# Patient Record
Sex: FEMALE | Race: WHITE | NOT HISPANIC OR LATINO
[De-identification: names, ages, dates, MRNs, and addresses within clinical notes are randomized per-mention and may not be internally consistent; named-entity substitution may affect disease eponyms.]

---

## 2023-04-17 ENCOUNTER — NON-APPOINTMENT (OUTPATIENT)
Age: 33
End: 2023-04-17

## 2023-04-24 PROBLEM — Z00.00 ENCOUNTER FOR PREVENTIVE HEALTH EXAMINATION: Status: ACTIVE | Noted: 2023-04-24

## 2023-06-08 ENCOUNTER — APPOINTMENT (OUTPATIENT)
Dept: OBGYN | Facility: CLINIC | Age: 33
End: 2023-06-08
Payer: COMMERCIAL

## 2023-06-08 ENCOUNTER — TRANSCRIPTION ENCOUNTER (OUTPATIENT)
Age: 33
End: 2023-06-08

## 2023-06-08 VITALS
HEIGHT: 68 IN | WEIGHT: 105 LBS | BODY MASS INDEX: 15.91 KG/M2 | SYSTOLIC BLOOD PRESSURE: 96 MMHG | DIASTOLIC BLOOD PRESSURE: 60 MMHG

## 2023-06-08 DIAGNOSIS — Z01.419 ENCOUNTER FOR GYNECOLOGICAL EXAMINATION (GENERAL) (ROUTINE) W/OUT ABNORMAL FINDINGS: ICD-10-CM

## 2023-06-08 PROCEDURE — 99385 PREV VISIT NEW AGE 18-39: CPT

## 2023-06-15 ENCOUNTER — APPOINTMENT (OUTPATIENT)
Dept: OBGYN | Facility: CLINIC | Age: 33
End: 2023-06-15

## 2023-06-16 NOTE — HISTORY OF PRESENT ILLNESS
[FreeTextEntry1] : HPI: Patient is a 34yo female presenting for her well woman exam. \par Patient is without complaints today.\par \par ROS: neg unless specified in HPI\par \par OB Hx: G0\par \par Gyn Hx: \par Menses: Q35-38 days\par No known fibroids, ovarian cysts, or other gynecologic problems\par Pap: none in EMR\par Contraception:none\par \par PMH: denies\par PSH: denies\par Meds: denies\par All: NKDA\par FH: Denies any breast, gynecologic, or GI cancers in the family\par SH: neg x 3\par \par Additional Exam: \par \par Gyn: Normal appearing external genitalia, normal appearing vagina and cervix, no CMT, bimanual with normal sized mobile uterus, no fixed adnexal masses or tenderness\par \par Breast: No lymphadenopathy in the neck, chest wall, bilateral supraclavicular, infraclavicular, and bilateral axillary areas. \par No overt asymmetry in bilateral breast contour with normal appearing skin and normal appearing nipple areolar complex bilaterally. \par No nipple discharge expressed.\par \par A/P: **yo ** female here for annual exam\par Gyn Screening:\par - Pap: cytology + automatic HR HPV sent\par - TSH sent, pt desiring to conceive.\par - Recommended HPV vaccine \par - STI screening: GC/CT/Trich sent\par \par Breast Screening: \par - Discussed self-breast exam\par - Clinical breast exam performed\par \par AHM: CV, Pulmonary, Endocrine, GI, Bone Screening, Vitamin supplementation, and Immunizations with PCP\par \par RTO for results and US\par GEORGI Ryan MD\par

## 2023-06-29 LAB
25(OH)D3 SERPL-MCNC: 33.1 NG/ML
C TRACH RRNA SPEC QL NAA+PROBE: NOT DETECTED
CYTOLOGY CVX/VAG DOC THIN PREP: NORMAL
HPV HIGH+LOW RISK DNA PNL CVX: NOT DETECTED
N GONORRHOEA RRNA SPEC QL NAA+PROBE: NOT DETECTED
SOURCE AMPLIFICATION: NORMAL
SOURCE TP AMPLIFICATION: NORMAL
T VAGINALIS RRNA SPEC QL NAA+PROBE: NOT DETECTED
TSH SERPL-ACNC: 1.13 UIU/ML

## 2023-07-26 ENCOUNTER — APPOINTMENT (OUTPATIENT)
Dept: OBGYN | Facility: CLINIC | Age: 33
End: 2023-07-26

## 2023-10-02 ENCOUNTER — APPOINTMENT (OUTPATIENT)
Dept: OBGYN | Facility: CLINIC | Age: 33
End: 2023-10-02
Payer: COMMERCIAL

## 2023-10-02 VITALS
HEIGHT: 68 IN | BODY MASS INDEX: 16.22 KG/M2 | WEIGHT: 107 LBS | SYSTOLIC BLOOD PRESSURE: 90 MMHG | DIASTOLIC BLOOD PRESSURE: 70 MMHG

## 2023-10-02 PROCEDURE — 76856 US EXAM PELVIC COMPLETE: CPT

## 2023-10-02 PROCEDURE — 99215 OFFICE O/P EST HI 40 MIN: CPT | Mod: 25

## 2023-10-09 RX ORDER — DOXYCYCLINE 100 MG/1
100 CAPSULE ORAL TWICE DAILY
Qty: 10 | Refills: 0 | Status: ACTIVE | COMMUNITY
Start: 2023-10-09 | End: 1900-01-01

## 2023-10-13 ENCOUNTER — RESULT REVIEW (OUTPATIENT)
Age: 33
End: 2023-10-13

## 2023-10-13 ENCOUNTER — OUTPATIENT (OUTPATIENT)
Dept: OUTPATIENT SERVICES | Facility: HOSPITAL | Age: 33
LOS: 1 days | End: 2023-10-13
Payer: COMMERCIAL

## 2023-10-13 DIAGNOSIS — Z00.00 ENCOUNTER FOR GENERAL ADULT MEDICAL EXAMINATION WITHOUT ABNORMAL FINDINGS: ICD-10-CM

## 2023-10-13 PROCEDURE — 74740 X-RAY FEMALE GENITAL TRACT: CPT

## 2023-10-13 PROCEDURE — 58340 CATHETER FOR HYSTEROGRAPHY: CPT | Mod: 1L

## 2023-10-13 PROCEDURE — 74740 X-RAY FEMALE GENITAL TRACT: CPT | Mod: 26,1L

## 2023-10-14 DIAGNOSIS — Z00.00 ENCOUNTER FOR GENERAL ADULT MEDICAL EXAMINATION WITHOUT ABNORMAL FINDINGS: ICD-10-CM

## 2023-12-21 ENCOUNTER — APPOINTMENT (OUTPATIENT)
Dept: OBGYN | Facility: CLINIC | Age: 33
End: 2023-12-21
Payer: COMMERCIAL

## 2023-12-21 VITALS
HEIGHT: 68 IN | WEIGHT: 108 LBS | DIASTOLIC BLOOD PRESSURE: 62 MMHG | BODY MASS INDEX: 16.37 KG/M2 | SYSTOLIC BLOOD PRESSURE: 108 MMHG

## 2023-12-21 PROCEDURE — 99215 OFFICE O/P EST HI 40 MIN: CPT | Mod: 25

## 2023-12-21 PROCEDURE — 76830 TRANSVAGINAL US NON-OB: CPT

## 2024-01-10 ENCOUNTER — ASOB RESULT (OUTPATIENT)
Age: 34
End: 2024-01-10

## 2024-01-10 ENCOUNTER — APPOINTMENT (OUTPATIENT)
Dept: MATERNAL FETAL MEDICINE | Facility: CLINIC | Age: 34
End: 2024-01-10
Payer: COMMERCIAL

## 2024-01-10 PROCEDURE — 99442: CPT

## 2024-01-12 ENCOUNTER — APPOINTMENT (OUTPATIENT)
Dept: ANTEPARTUM | Facility: CLINIC | Age: 34
End: 2024-01-12
Payer: COMMERCIAL

## 2024-01-12 PROCEDURE — 36415 COLL VENOUS BLD VENIPUNCTURE: CPT

## 2024-01-22 ENCOUNTER — NON-APPOINTMENT (OUTPATIENT)
Age: 34
End: 2024-01-22

## 2024-01-23 ENCOUNTER — ASOB RESULT (OUTPATIENT)
Age: 34
End: 2024-01-23

## 2024-01-23 ENCOUNTER — APPOINTMENT (OUTPATIENT)
Dept: ANTEPARTUM | Facility: CLINIC | Age: 34
End: 2024-01-23
Payer: COMMERCIAL

## 2024-01-23 DIAGNOSIS — O35.9XX0 MATERNAL CARE FOR (SUSPECTED) FETAL ABNORMALITY AND DAMAGE, UNSPECIFIED, NOT APPLICABLE OR UNSPECIFIED: ICD-10-CM

## 2024-01-23 PROCEDURE — 76813 OB US NUCHAL MEAS 1 GEST: CPT

## 2024-01-23 PROCEDURE — 36415 COLL VENOUS BLD VENIPUNCTURE: CPT

## 2024-01-25 ENCOUNTER — LABORATORY RESULT (OUTPATIENT)
Age: 34
End: 2024-01-25

## 2024-01-25 ENCOUNTER — APPOINTMENT (OUTPATIENT)
Dept: OBGYN | Facility: CLINIC | Age: 34
End: 2024-01-25
Payer: COMMERCIAL

## 2024-01-25 VITALS
HEIGHT: 68 IN | DIASTOLIC BLOOD PRESSURE: 68 MMHG | SYSTOLIC BLOOD PRESSURE: 106 MMHG | BODY MASS INDEX: 16.37 KG/M2 | RESPIRATION RATE: 16 BRPM | WEIGHT: 108 LBS

## 2024-01-25 DIAGNOSIS — Z34.91 ENCOUNTER FOR SUPERVISION OF NORMAL PREGNANCY, UNSPECIFIED, FIRST TRIMESTER: ICD-10-CM

## 2024-01-25 PROCEDURE — 99215 OFFICE O/P EST HI 40 MIN: CPT

## 2024-02-07 LAB
ABO + RH PNL BLD: NORMAL
APPEARANCE: ABNORMAL
BACTERIA UR CULT: NORMAL
BASOPHILS # BLD AUTO: 0.06 K/UL
BASOPHILS NFR BLD AUTO: 0.8 %
BILIRUBIN URINE: NEGATIVE
BLD GP AB SCN SERPL QL: NORMAL
BLOOD URINE: NEGATIVE
C TRACH RRNA SPEC QL NAA+PROBE: NOT DETECTED
COLOR: NORMAL
EOSINOPHIL # BLD AUTO: 0.17 K/UL
EOSINOPHIL NFR BLD AUTO: 2.1 %
GLUCOSE QUALITATIVE U: NEGATIVE MG/DL
HBV SURFACE AG SER QL: NONREACTIVE
HCT VFR BLD CALC: 36.7 %
HCV AB SER QL: NONREACTIVE
HCV S/CO RATIO: 0.07 S/CO
HGB A MFR BLD: 97.1 %
HGB A2 MFR BLD: 2.5 %
HGB BLD-MCNC: 11.6 G/DL
HGB F MFR BLD: 0.4 %
HGB FRACT BLD-IMP: NORMAL
HIV1+2 AB SPEC QL IA.RAPID: NONREACTIVE
IMM GRANULOCYTES NFR BLD AUTO: 0.3 %
KETONES URINE: ABNORMAL MG/DL
LEAD BLD-MCNC: <1 UG/DL
LEUKOCYTE ESTERASE URINE: ABNORMAL
LYMPHOCYTES # BLD AUTO: 1.6 K/UL
LYMPHOCYTES NFR BLD AUTO: 20.2 %
M TB IFN-G BLD-IMP: NEGATIVE
MAN DIFF?: NORMAL
MCHC RBC-ENTMCNC: 25.4 PG
MCHC RBC-ENTMCNC: 31.6 GM/DL
MCV RBC AUTO: 80.3 FL
MEV IGG FLD QL IA: 117 AU/ML
MEV IGG+IGM SER-IMP: POSITIVE
MONOCYTES # BLD AUTO: 0.52 K/UL
MONOCYTES NFR BLD AUTO: 6.6 %
N GONORRHOEA RRNA SPEC QL NAA+PROBE: NOT DETECTED
NEUTROPHILS # BLD AUTO: 5.55 K/UL
NEUTROPHILS NFR BLD AUTO: 70 %
NITRITE URINE: NEGATIVE
PH URINE: 5
PLATELET # BLD AUTO: 242 K/UL
PROTEIN URINE: NORMAL MG/DL
QUANTIFERON TB PLUS MITOGEN MINUS NIL: 8.5 IU/ML
QUANTIFERON TB PLUS NIL: 0.01 IU/ML
QUANTIFERON TB PLUS TB1 MINUS NIL: 0 IU/ML
QUANTIFERON TB PLUS TB2 MINUS NIL: 0 IU/ML
RBC # BLD: 4.57 M/UL
RBC # FLD: 14.8 %
RUBV IGG FLD-ACNC: 6.3 INDEX
RUBV IGG SER-IMP: POSITIVE
SOURCE AMPLIFICATION: NORMAL
SPECIFIC GRAVITY URINE: >1.03
T PALLIDUM AB SER QL IA: NEGATIVE
UROBILINOGEN URINE: 0.2 MG/DL
VZV AB TITR SER: POSITIVE
VZV IGG SER IF-ACNC: 1387 INDEX
WBC # FLD AUTO: 7.92 K/UL

## 2024-02-14 LAB
ADDITIONAL US: NORMAL
COMMENTS: AFP: NORMAL
CRL SCAN TWIN B: NORMAL
CRL SCAN: NORMAL
CROWN RUMP LENGTH TWIN B: NORMAL
CROWN RUMP LENGTH: 67.9 MM
DOWN SYNDROME AGE RISK: NORMAL
DOWN SYNDROME INTERPRETATION: NORMAL
DOWN SYNDROME SCREENING RISK: NORMAL
GEST. AGE ON COLLECTION DATE: 12.9 WEEKS
HCG MOM: 1.94
HCG VALUE: 223.4 IU/ML
MATERNAL AGE AT EDD: 36.2 YR
NOTE: AFP: NORMAL
NT MOM TWIN B: NORMAL
NT TWIN B: NORMAL
NUCHAL TRANSLUCENCY (NT): 1.6 MM
NUCHAL TRANSLUCENCY MOM: 0.91
NUMBER OF FETUSES: 1
PAPP-A MOM: 1.36
PAPP-A VALUE: 2329.4 NG/ML
RACE: NORMAL
RESULTS AFP: NORMAL
SONOGRAPHER ID#: NORMAL
SUBMIT PART 2 SAMPLE USING: NORMAL
TEST RESULTS: AFP: NORMAL
TRISOMY 18 AGE RISK: NORMAL
TRISOMY 18 INTERPRETATION: NORMAL
TRISOMY 18 SCREENING RISK: NORMAL
WEIGHT AFP: 108 LBS

## 2024-02-20 ENCOUNTER — NON-APPOINTMENT (OUTPATIENT)
Age: 34
End: 2024-02-20

## 2024-02-21 ENCOUNTER — NON-APPOINTMENT (OUTPATIENT)
Age: 34
End: 2024-02-21

## 2024-02-22 ENCOUNTER — APPOINTMENT (OUTPATIENT)
Dept: OBGYN | Facility: CLINIC | Age: 34
End: 2024-02-22
Payer: COMMERCIAL

## 2024-02-22 ENCOUNTER — NON-APPOINTMENT (OUTPATIENT)
Age: 34
End: 2024-02-22

## 2024-02-22 VITALS
BODY MASS INDEX: 16.97 KG/M2 | WEIGHT: 112 LBS | SYSTOLIC BLOOD PRESSURE: 96 MMHG | HEIGHT: 68 IN | DIASTOLIC BLOOD PRESSURE: 58 MMHG

## 2024-02-22 DIAGNOSIS — Z34.90 ENCOUNTER FOR SUPERVISION OF NORMAL PREGNANCY, UNSPECIFIED, UNSPECIFIED TRIMESTER: ICD-10-CM

## 2024-02-22 PROCEDURE — 0502F SUBSEQUENT PRENATAL CARE: CPT

## 2024-02-28 LAB
ADDITIONAL US: NORMAL
AFP MOM: 0.99
AFP VALUE: 44.7 NG/ML
APPEARANCE: CLEAR
BILIRUBIN URINE: NEGATIVE
BLOOD URINE: NEGATIVE
COLLECTED ON 2: NORMAL
COLLECTED ON: NORMAL
COLOR: YELLOW
CRL SCAN TWIN B: NORMAL
CRL SCAN: NORMAL
CROWN RUMP LENGTH TWIN B: NORMAL
CROWN RUMP LENGTH: 67.9 MM
DIA MOM: 1.14
DIA VALUE: 216.1 PG/ML
DOWN SYNDROME AGE RISK: NORMAL
DOWN SYNDROME INTERPRETATION: NORMAL
DOWN SYNDROME SCREENING RISK: NORMAL
FIRST TRIMESTER SAMPLE: NORMAL
GEST. AGE ON COLLECTION DATE: 12.9 WEEKS
GESTATIONAL AGE: 17.1 WEEKS
GLUCOSE QUALITATIVE U: NEGATIVE
HCG MOM: 1.21
HCG VALUE: 42.9 IU/ML
INSULIN DEP DIABETES: NO
KETONES URINE: NEGATIVE
LEUKOCYTE ESTERASE URINE: NEGATIVE
MATERNAL AGE AT EDD: 36.2 YR
NITRITE URINE: NEGATIVE
NT MOM TWIN B: NORMAL
NT TWIN B: NORMAL
NUCHAL TRANSLUCENCY (NT): 1.6 MM
NUCHAL TRANSLUCENCY MOM: 0.91
NUMBER OF FETUSES: 1
OPEN SPINA BIFIDA: NORMAL
OSB INTERPRETATION: NORMAL
PAPP-A MOM: 1.36
PAPP-A VALUE: 2329.4 NG/ML
PH URINE: 7
PROTEIN URINE: NEGATIVE
RACE: NORMAL
SECOND TRIMESTER SAMPLE: NORMAL
SEQUENTIAL 2 COMMENTS: NORMAL
SEQUENTIAL 2 NOTE: NORMAL
SEQUENTIAL 2 RESULTS: NORMAL
SEQUENTIAL 2 TEST RESULTS: NORMAL
SONOGRAPHER ID#: NORMAL
SPECIFIC GRAVITY URINE: 1.01
TRISOMY 18 AGE RISK: NORMAL
TRISOMY 18 INTERPRETATION: NORMAL
TRISOMY 18 SCREENING RISK: NORMAL
UE3 MOM: 0.6
UE3 VALUE: 0.77 NG/ML
UROBILINOGEN URINE: 0.2 (ref 0.2–?)
WEIGHT AFP: 108 LBS
WEIGHT: 106 LBS

## 2024-03-12 ENCOUNTER — NON-APPOINTMENT (OUTPATIENT)
Age: 34
End: 2024-03-12

## 2024-03-20 ENCOUNTER — ASOB RESULT (OUTPATIENT)
Age: 34
End: 2024-03-20

## 2024-03-20 ENCOUNTER — APPOINTMENT (OUTPATIENT)
Dept: ANTEPARTUM | Facility: CLINIC | Age: 34
End: 2024-03-20
Payer: COMMERCIAL

## 2024-03-20 ENCOUNTER — NON-APPOINTMENT (OUTPATIENT)
Age: 34
End: 2024-03-20

## 2024-03-20 PROCEDURE — 76811 OB US DETAILED SNGL FETUS: CPT

## 2024-03-20 PROCEDURE — 76817 TRANSVAGINAL US OBSTETRIC: CPT

## 2024-03-21 ENCOUNTER — APPOINTMENT (OUTPATIENT)
Dept: OBGYN | Facility: CLINIC | Age: 34
End: 2024-03-21
Payer: COMMERCIAL

## 2024-03-21 VITALS
DIASTOLIC BLOOD PRESSURE: 50 MMHG | SYSTOLIC BLOOD PRESSURE: 102 MMHG | WEIGHT: 120 LBS | HEIGHT: 68 IN | BODY MASS INDEX: 18.19 KG/M2

## 2024-03-21 LAB
BILIRUB UR QL STRIP: NORMAL
CLARITY UR: CLEAR
COLLECTION METHOD: NORMAL
GLUCOSE UR-MCNC: NORMAL
HCG UR QL: 0.2 EU/DL
HGB UR QL STRIP.AUTO: NORMAL
KETONES UR-MCNC: NORMAL
LEUKOCYTE ESTERASE UR QL STRIP: NORMAL
NITRITE UR QL STRIP: NORMAL
PH UR STRIP: 7
PROT UR STRIP-MCNC: NORMAL
SP GR UR STRIP: 1.02

## 2024-03-21 PROCEDURE — 0502F SUBSEQUENT PRENATAL CARE: CPT

## 2024-03-21 PROCEDURE — 81003 URINALYSIS AUTO W/O SCOPE: CPT | Mod: NC,QW

## 2024-04-17 ENCOUNTER — NON-APPOINTMENT (OUTPATIENT)
Age: 34
End: 2024-04-17

## 2024-04-17 ENCOUNTER — APPOINTMENT (OUTPATIENT)
Dept: OBGYN | Facility: CLINIC | Age: 34
End: 2024-04-17
Payer: COMMERCIAL

## 2024-04-17 VITALS
WEIGHT: 121 LBS | SYSTOLIC BLOOD PRESSURE: 110 MMHG | HEART RATE: 74 BPM | HEIGHT: 68 IN | BODY MASS INDEX: 18.34 KG/M2 | DIASTOLIC BLOOD PRESSURE: 60 MMHG | RESPIRATION RATE: 15 BRPM

## 2024-04-17 PROCEDURE — 0502F SUBSEQUENT PRENATAL CARE: CPT

## 2024-05-09 ENCOUNTER — NON-APPOINTMENT (OUTPATIENT)
Age: 34
End: 2024-05-09

## 2024-05-13 ENCOUNTER — ASOB RESULT (OUTPATIENT)
Age: 34
End: 2024-05-13

## 2024-05-13 ENCOUNTER — APPOINTMENT (OUTPATIENT)
Dept: OBGYN | Facility: CLINIC | Age: 34
End: 2024-05-13
Payer: COMMERCIAL

## 2024-05-13 ENCOUNTER — APPOINTMENT (OUTPATIENT)
Dept: ANTEPARTUM | Facility: CLINIC | Age: 34
End: 2024-05-13
Payer: COMMERCIAL

## 2024-05-13 VITALS
DIASTOLIC BLOOD PRESSURE: 60 MMHG | WEIGHT: 128 LBS | BODY MASS INDEX: 19.4 KG/M2 | HEIGHT: 68 IN | SYSTOLIC BLOOD PRESSURE: 112 MMHG

## 2024-05-13 DIAGNOSIS — Z34.92 ENCOUNTER FOR SUPERVISION OF NORMAL PREGNANCY, UNSPECIFIED, SECOND TRIMESTER: ICD-10-CM

## 2024-05-13 PROCEDURE — 36415 COLL VENOUS BLD VENIPUNCTURE: CPT

## 2024-05-13 PROCEDURE — 76819 FETAL BIOPHYS PROFIL W/O NST: CPT

## 2024-05-13 PROCEDURE — 0502F SUBSEQUENT PRENATAL CARE: CPT

## 2024-05-13 PROCEDURE — 81003 URINALYSIS AUTO W/O SCOPE: CPT | Mod: NC,QW

## 2024-05-13 PROCEDURE — 76816 OB US FOLLOW-UP PER FETUS: CPT

## 2024-05-14 LAB
BASOPHILS # BLD AUTO: 0.06 K/UL
BASOPHILS NFR BLD AUTO: 0.6 %
EOSINOPHIL # BLD AUTO: 0.12 K/UL
EOSINOPHIL NFR BLD AUTO: 1.2 %
GLUCOSE 1H P 50 G GLC PO SERPL-MCNC: 90 MG/DL
HCT VFR BLD CALC: 38.7 %
HGB BLD-MCNC: 11.6 G/DL
IMM GRANULOCYTES NFR BLD AUTO: 1 %
LYMPHOCYTES # BLD AUTO: 1.49 K/UL
LYMPHOCYTES NFR BLD AUTO: 15.4 %
MAN DIFF?: NORMAL
MCHC RBC-ENTMCNC: 26.2 PG
MCHC RBC-ENTMCNC: 30 GM/DL
MCV RBC AUTO: 87.4 FL
MONOCYTES # BLD AUTO: 0.6 K/UL
MONOCYTES NFR BLD AUTO: 6.2 %
MUV AB SER-ACNC: POSITIVE
MUV IGG SER QL IA: 137 AU/ML
NEUTROPHILS # BLD AUTO: 7.33 K/UL
NEUTROPHILS NFR BLD AUTO: 75.6 %
PLATELET # BLD AUTO: 201 K/UL
RBC # BLD: 4.43 M/UL
RBC # FLD: 14.7 %
T PALLIDUM AB SER QL IA: NEGATIVE
WBC # FLD AUTO: 9.7 K/UL

## 2024-05-20 ENCOUNTER — NON-APPOINTMENT (OUTPATIENT)
Age: 34
End: 2024-05-20

## 2024-05-20 LAB — MUV IGM SER QL IA: <0.8 AU

## 2024-05-20 RX ORDER — VALACYCLOVIR 1 G/1
1 TABLET, FILM COATED ORAL
Qty: 10 | Refills: 3 | Status: ACTIVE | COMMUNITY
Start: 2024-05-20 | End: 1900-01-01

## 2024-06-05 ENCOUNTER — APPOINTMENT (OUTPATIENT)
Dept: OBGYN | Facility: CLINIC | Age: 34
End: 2024-06-05
Payer: COMMERCIAL

## 2024-06-05 VITALS
DIASTOLIC BLOOD PRESSURE: 70 MMHG | WEIGHT: 132 LBS | HEIGHT: 68 IN | SYSTOLIC BLOOD PRESSURE: 114 MMHG | BODY MASS INDEX: 20 KG/M2

## 2024-06-05 PROCEDURE — 0502F SUBSEQUENT PRENATAL CARE: CPT

## 2024-06-05 PROCEDURE — 90471 IMMUNIZATION ADMIN: CPT

## 2024-06-05 PROCEDURE — 81003 URINALYSIS AUTO W/O SCOPE: CPT | Mod: NC,QW

## 2024-06-05 PROCEDURE — 90715 TDAP VACCINE 7 YRS/> IM: CPT

## 2024-06-11 ENCOUNTER — ASOB RESULT (OUTPATIENT)
Age: 34
End: 2024-06-11

## 2024-06-11 ENCOUNTER — APPOINTMENT (OUTPATIENT)
Dept: ANTEPARTUM | Facility: CLINIC | Age: 34
End: 2024-06-11
Payer: COMMERCIAL

## 2024-06-11 PROCEDURE — 76816 OB US FOLLOW-UP PER FETUS: CPT

## 2024-06-13 ENCOUNTER — NON-APPOINTMENT (OUTPATIENT)
Age: 34
End: 2024-06-13

## 2024-06-17 ENCOUNTER — APPOINTMENT (OUTPATIENT)
Dept: OBGYN | Facility: CLINIC | Age: 34
End: 2024-06-17
Payer: COMMERCIAL

## 2024-06-17 VITALS
SYSTOLIC BLOOD PRESSURE: 104 MMHG | BODY MASS INDEX: 20.33 KG/M2 | WEIGHT: 134.13 LBS | DIASTOLIC BLOOD PRESSURE: 66 MMHG | HEIGHT: 68 IN

## 2024-06-17 LAB
BILIRUB UR QL STRIP: NORMAL
CLARITY UR: CLEAR
COLLECTION METHOD: NORMAL
GLUCOSE UR-MCNC: NORMAL
HCG UR QL: 1 EU/DL
HGB UR QL STRIP.AUTO: NORMAL
KETONES UR-MCNC: NORMAL
LEUKOCYTE ESTERASE UR QL STRIP: NORMAL
NITRITE UR QL STRIP: NORMAL
PH UR STRIP: 6.5
PROT UR STRIP-MCNC: 30
SP GR UR STRIP: 1.02

## 2024-06-17 PROCEDURE — 81003 URINALYSIS AUTO W/O SCOPE: CPT | Mod: NC,QW

## 2024-06-17 PROCEDURE — 0502F SUBSEQUENT PRENATAL CARE: CPT

## 2024-07-01 ENCOUNTER — NON-APPOINTMENT (OUTPATIENT)
Age: 34
End: 2024-07-01

## 2024-07-01 LAB
BILIRUB UR QL STRIP: NORMAL
GLUCOSE UR-MCNC: NORMAL
HCG UR QL: 0.2 EU/DL
HGB UR QL STRIP.AUTO: NORMAL
KETONES UR-MCNC: NORMAL
LEUKOCYTE ESTERASE UR QL STRIP: NORMAL
NITRITE UR QL STRIP: NORMAL
PH UR STRIP: 5.5
PROT UR STRIP-MCNC: NORMAL
SP GR UR STRIP: 1.03

## 2024-07-02 ENCOUNTER — APPOINTMENT (OUTPATIENT)
Dept: OBGYN | Facility: CLINIC | Age: 34
End: 2024-07-02
Payer: COMMERCIAL

## 2024-07-02 VITALS
DIASTOLIC BLOOD PRESSURE: 72 MMHG | HEIGHT: 68 IN | BODY MASS INDEX: 20.82 KG/M2 | WEIGHT: 137.38 LBS | SYSTOLIC BLOOD PRESSURE: 122 MMHG

## 2024-07-02 PROCEDURE — 0502F SUBSEQUENT PRENATAL CARE: CPT

## 2024-07-02 PROCEDURE — 81003 URINALYSIS AUTO W/O SCOPE: CPT | Mod: NC,QW

## 2024-07-08 ENCOUNTER — APPOINTMENT (OUTPATIENT)
Dept: OBGYN | Facility: CLINIC | Age: 34
End: 2024-07-08

## 2024-07-08 DIAGNOSIS — Z34.93 ENCOUNTER FOR SUPERVISION OF NORMAL PREGNANCY, UNSPECIFIED, THIRD TRIMESTER: ICD-10-CM

## 2024-07-09 ENCOUNTER — RESULT REVIEW (OUTPATIENT)
Age: 34
End: 2024-07-09

## 2024-07-09 ENCOUNTER — INPATIENT (INPATIENT)
Facility: HOSPITAL | Age: 34
LOS: 1 days | Discharge: HOME | End: 2024-07-11
Attending: OBSTETRICS & GYNECOLOGY | Admitting: OBSTETRICS & GYNECOLOGY
Payer: COMMERCIAL

## 2024-07-09 VITALS
SYSTOLIC BLOOD PRESSURE: 103 MMHG | OXYGEN SATURATION: 99 % | HEIGHT: 68 IN | DIASTOLIC BLOOD PRESSURE: 66 MMHG | TEMPERATURE: 98 F | WEIGHT: 132.94 LBS | HEART RATE: 68 BPM | RESPIRATION RATE: 19 BRPM

## 2024-07-09 DIAGNOSIS — O26.899 OTHER SPECIFIED PREGNANCY RELATED CONDITIONS, UNSPECIFIED TRIMESTER: ICD-10-CM

## 2024-07-09 LAB
BASOPHILS # BLD AUTO: 0.06 K/UL — SIGNIFICANT CHANGE UP (ref 0–0.2)
BASOPHILS NFR BLD AUTO: 0.7 % — SIGNIFICANT CHANGE UP (ref 0–2)
EOSINOPHIL # BLD AUTO: 0.06 K/UL — SIGNIFICANT CHANGE UP (ref 0–0.5)
EOSINOPHIL NFR BLD AUTO: 0.7 % — SIGNIFICANT CHANGE UP (ref 0–6)
HCT VFR BLD CALC: 36.7 % — SIGNIFICANT CHANGE UP (ref 34.5–45)
HGB BLD-MCNC: 12.1 G/DL — SIGNIFICANT CHANGE UP (ref 11.5–15.5)
IMM GRANULOCYTES NFR BLD AUTO: 0.8 % — SIGNIFICANT CHANGE UP (ref 0–0.9)
LYMPHOCYTES # BLD AUTO: 1.04 K/UL — SIGNIFICANT CHANGE UP (ref 1–3.3)
LYMPHOCYTES # BLD AUTO: 12.3 % — LOW (ref 13–44)
MCHC RBC-ENTMCNC: 26.2 PG — LOW (ref 27–34)
MCHC RBC-ENTMCNC: 33 GM/DL — SIGNIFICANT CHANGE UP (ref 32–36)
MCV RBC AUTO: 79.6 FL — LOW (ref 80–100)
MONOCYTES # BLD AUTO: 0.8 K/UL — SIGNIFICANT CHANGE UP (ref 0–0.9)
MONOCYTES NFR BLD AUTO: 9.5 % — SIGNIFICANT CHANGE UP (ref 2–14)
NEUTROPHILS # BLD AUTO: 6.43 K/UL — SIGNIFICANT CHANGE UP (ref 1.8–7.4)
NEUTROPHILS NFR BLD AUTO: 76 % — SIGNIFICANT CHANGE UP (ref 43–77)
PLATELET # BLD AUTO: 198 K/UL — SIGNIFICANT CHANGE UP (ref 150–400)
RBC # BLD: 4.61 M/UL — SIGNIFICANT CHANGE UP (ref 3.8–5.2)
RBC # FLD: 14.2 % — SIGNIFICANT CHANGE UP (ref 10.3–14.5)
T PALLIDUM AB TITR SER: NEGATIVE — SIGNIFICANT CHANGE UP
WBC # BLD: 8.46 K/UL — SIGNIFICANT CHANGE UP (ref 3.8–10.5)
WBC # FLD AUTO: 8.46 K/UL — SIGNIFICANT CHANGE UP (ref 3.8–10.5)

## 2024-07-09 PROCEDURE — 99214 OFFICE O/P EST MOD 30 MIN: CPT

## 2024-07-09 PROCEDURE — 36415 COLL VENOUS BLD VENIPUNCTURE: CPT

## 2024-07-09 PROCEDURE — 86850 RBC ANTIBODY SCREEN: CPT

## 2024-07-09 PROCEDURE — 85018 HEMOGLOBIN: CPT

## 2024-07-09 PROCEDURE — 86900 BLOOD TYPING SEROLOGIC ABO: CPT

## 2024-07-09 PROCEDURE — 88307 TISSUE EXAM BY PATHOLOGIST: CPT

## 2024-07-09 PROCEDURE — 88307 TISSUE EXAM BY PATHOLOGIST: CPT | Mod: 26

## 2024-07-09 PROCEDURE — C1889: CPT

## 2024-07-09 PROCEDURE — 59050 FETAL MONITOR W/REPORT: CPT

## 2024-07-09 PROCEDURE — 85014 HEMATOCRIT: CPT

## 2024-07-09 PROCEDURE — 94760 N-INVAS EAR/PLS OXIMETRY 1: CPT

## 2024-07-09 PROCEDURE — 87653 STREP B DNA AMP PROBE: CPT

## 2024-07-09 PROCEDURE — 85025 COMPLETE CBC W/AUTO DIFF WBC: CPT

## 2024-07-09 PROCEDURE — 86780 TREPONEMA PALLIDUM: CPT

## 2024-07-09 PROCEDURE — 86901 BLOOD TYPING SEROLOGIC RH(D): CPT

## 2024-07-09 PROCEDURE — 87070 CULTURE OTHR SPECIMN AEROBIC: CPT

## 2024-07-09 RX ORDER — PRENATAL VIT/IRON FUM/FOLIC AC 60 MG-1 MG
1 TABLET ORAL DAILY
Refills: 0 | Status: DISCONTINUED | OUTPATIENT
Start: 2024-07-09 | End: 2024-07-11

## 2024-07-09 RX ORDER — LANOLIN
1 WAX (GRAM) MISCELLANEOUS EVERY 6 HOURS
Refills: 0 | Status: DISCONTINUED | OUTPATIENT
Start: 2024-07-09 | End: 2024-07-11

## 2024-07-09 RX ORDER — DIPHENHYDRAMINE HCL 12.5MG/5ML
25 ELIXIR ORAL EVERY 6 HOURS
Refills: 0 | Status: DISCONTINUED | OUTPATIENT
Start: 2024-07-09 | End: 2024-07-11

## 2024-07-09 RX ORDER — ACETAMINOPHEN 325 MG
975 TABLET ORAL
Refills: 0 | Status: DISCONTINUED | OUTPATIENT
Start: 2024-07-09 | End: 2024-07-11

## 2024-07-09 RX ORDER — OXYCODONE HYDROCHLORIDE 100 MG/5ML
5 SOLUTION ORAL ONCE
Refills: 0 | Status: DISCONTINUED | OUTPATIENT
Start: 2024-07-09 | End: 2024-07-11

## 2024-07-09 RX ORDER — AMPICILLIN TRIHYDRATE 250 MG
2 CAPSULE ORAL ONCE
Refills: 0 | Status: COMPLETED | OUTPATIENT
Start: 2024-07-09 | End: 2024-07-09

## 2024-07-09 RX ORDER — OXYCODONE HYDROCHLORIDE 100 MG/5ML
5 SOLUTION ORAL
Refills: 0 | Status: DISCONTINUED | OUTPATIENT
Start: 2024-07-09 | End: 2024-07-11

## 2024-07-09 RX ORDER — AMPICILLIN TRIHYDRATE 250 MG
1 CAPSULE ORAL EVERY 4 HOURS
Refills: 0 | Status: DISCONTINUED | OUTPATIENT
Start: 2024-07-09 | End: 2024-07-09

## 2024-07-09 RX ORDER — DEXTROSE MONOHYDRATE AND SODIUM CHLORIDE 5; .3 G/100ML; G/100ML
1000 INJECTION, SOLUTION INTRAVENOUS
Refills: 0 | Status: DISCONTINUED | OUTPATIENT
Start: 2024-07-09 | End: 2024-07-09

## 2024-07-09 RX ORDER — PRAMOXINE HCL 1 %
1 CREAM (GRAM) RECTAL EVERY 4 HOURS
Refills: 0 | Status: DISCONTINUED | OUTPATIENT
Start: 2024-07-09 | End: 2024-07-11

## 2024-07-09 RX ORDER — OXYTOCIN 30 [USP'U]/500ML
INJECTION, SOLUTION INTRAVENOUS
Qty: 30 | Refills: 0 | Status: DISCONTINUED | OUTPATIENT
Start: 2024-07-09 | End: 2024-07-09

## 2024-07-09 RX ORDER — SIMETHICONE 40MG/0.6ML
80 SUSPENSION, DROPS(FINAL DOSAGE FORM)(ML) ORAL EVERY 4 HOURS
Refills: 0 | Status: DISCONTINUED | OUTPATIENT
Start: 2024-07-09 | End: 2024-07-11

## 2024-07-09 RX ORDER — KETOROLAC TROMETHAMINE 30 MG/ML
30 INJECTION, SOLUTION INTRAMUSCULAR ONCE
Refills: 0 | Status: DISCONTINUED | OUTPATIENT
Start: 2024-07-09 | End: 2024-07-09

## 2024-07-09 RX ORDER — OXYTOCIN 30 [USP'U]/500ML
333.33 INJECTION, SOLUTION INTRAVENOUS
Qty: 20 | Refills: 0 | Status: DISCONTINUED | OUTPATIENT
Start: 2024-07-09 | End: 2024-07-11

## 2024-07-09 RX ORDER — DIBUCAINE 1 %
1 OINTMENT (GRAM) TOPICAL EVERY 6 HOURS
Refills: 0 | Status: DISCONTINUED | OUTPATIENT
Start: 2024-07-09 | End: 2024-07-11

## 2024-07-09 RX ORDER — SODIUM CHLORIDE 0.9 % (FLUSH) 0.9 %
3 SYRINGE (ML) INJECTION EVERY 8 HOURS
Refills: 0 | Status: DISCONTINUED | OUTPATIENT
Start: 2024-07-09 | End: 2024-07-11

## 2024-07-09 RX ORDER — HYDROCORTISONE VALERATE 0.2 %
1 CREAM (GRAM) TOPICAL EVERY 6 HOURS
Refills: 0 | Status: DISCONTINUED | OUTPATIENT
Start: 2024-07-09 | End: 2024-07-11

## 2024-07-09 RX ORDER — BENZOCAINE 15 %
1 LIQUID (ML) TOPICAL EVERY 6 HOURS
Refills: 0 | Status: DISCONTINUED | OUTPATIENT
Start: 2024-07-09 | End: 2024-07-11

## 2024-07-09 RX ORDER — TRISODIUM CITRATE DIHYDRATE AND CITRIC ACID MONOHYDRATE 500; 334 MG/5ML; MG/5ML
30 SOLUTION ORAL ONCE
Refills: 0 | Status: COMPLETED | OUTPATIENT
Start: 2024-07-09 | End: 2024-07-09

## 2024-07-09 RX ORDER — TETANUS TOXOID, REDUCED DIPHTHERIA TOXOID AND ACELLULAR PERTUSSIS VACCINE, ADSORBED 5; 2.5; 8; 8; 2.5 [IU]/.5ML; [IU]/.5ML; UG/.5ML; UG/.5ML; UG/.5ML
0.5 SUSPENSION INTRAMUSCULAR ONCE
Refills: 0 | Status: DISCONTINUED | OUTPATIENT
Start: 2024-07-09 | End: 2024-07-11

## 2024-07-09 RX ORDER — HYDROCORTISONE ACETATE 1 %
1 OINTMENT (GRAM) RECTAL EVERY 4 HOURS
Refills: 0 | Status: DISCONTINUED | OUTPATIENT
Start: 2024-07-09 | End: 2024-07-11

## 2024-07-09 RX ORDER — OXYTOCIN 30 [USP'U]/500ML
41.67 INJECTION, SOLUTION INTRAVENOUS
Qty: 20 | Refills: 0 | Status: DISCONTINUED | OUTPATIENT
Start: 2024-07-09 | End: 2024-07-11

## 2024-07-09 RX ADMIN — DEXTROSE MONOHYDRATE AND SODIUM CHLORIDE 125 MILLILITER(S): 5; .3 INJECTION, SOLUTION INTRAVENOUS at 10:15

## 2024-07-09 RX ADMIN — KETOROLAC TROMETHAMINE 30 MILLIGRAM(S): 30 INJECTION, SOLUTION INTRAMUSCULAR at 23:49

## 2024-07-09 RX ADMIN — TRISODIUM CITRATE DIHYDRATE AND CITRIC ACID MONOHYDRATE 30 MILLILITER(S): 500; 334 SOLUTION ORAL at 18:34

## 2024-07-09 RX ADMIN — Medication 108 GRAM(S): at 14:30

## 2024-07-09 RX ADMIN — OXYTOCIN 2 MILLIUNIT(S)/MIN: 30 INJECTION, SOLUTION INTRAVENOUS at 12:28

## 2024-07-09 RX ADMIN — Medication 108 GRAM(S): at 18:26

## 2024-07-09 RX ADMIN — Medication 216 GRAM(S): at 10:15

## 2024-07-09 NOTE — OB RN PATIENT PROFILE - BREASTFEEDING MOTHER TAUGHT HOW TO HAND EXPRESS THEIR OWN MILK
Pt states decreased nausea-  States headache is \"Tolerable- I feel much better\"     Neel Gama RN  02/10/21 1395 Statement Selected

## 2024-07-09 NOTE — OB RN PATIENT PROFILE - CAREGIVER
Tachycardia- per patient, baseline tachycardic. Received 500ml bolus in ED, will continue IV fluid. Monitor HR Declines

## 2024-07-09 NOTE — OB PROVIDER H&P - NSLOWPPHRISK_OBGYN_A_OB
No previous uterine incision/Quick Pregnancy/Less than or equal to 4 previous vaginal births/No known bleeding disorder/No history of postpartum hemorrhage/No other PPH risks indicated

## 2024-07-09 NOTE — OB NEONATOLOGY/PEDIATRICIAN DELIVERY SUMMARY - NSPEDSNEONOTESA_OBGYN_ALL_OB_FT
Called by OB to attend  vaginal delivery due to cat 2 tracing and possible chorio . Baby is  product of a 36.3 week gestation born to a G 1 P0    34 year old female   Maternal labs include Blood Type  A+  , HIV neg , RPR neg , Hep B[- ], GBS  unknown treated multiple times with Ampi , Mother also with history of HSV not on valtrex but negative speculum exam.   Mother COVI + on 7/6, afebrile.  Mother was on ASA, iron, calcium and PNV during pregnancy  ROM at 7:15 am on 7/9 , approximately  12 hrs.  DCC x 60s .  Apgars were: 9/9 . Mother afebrile at time of delivery

## 2024-07-09 NOTE — OB RN PATIENT PROFILE - FALL HARM RISK - UNIVERSAL INTERVENTIONS
Bed in lowest position, wheels locked, appropriate side rails in place/Call bell, personal items and telephone in reach/Instruct patient to call for assistance before getting out of bed or chair/Non-slip footwear when patient is out of bed/[place] to call system/Physically safe environment - no spills, clutter or unnecessary equipment/Purposeful Proactive Rounding/Room/bathroom lighting operational, light cord in reach

## 2024-07-09 NOTE — OB RN PATIENT PROFILE - CURRENT PREGNANCY COMPLICATIONS, OB PROFILE
COVID Pos /, Hx HSV no active lesions on speculum exam/ Premature Rupture of Membranes (PPROM)/Other

## 2024-07-09 NOTE — OB PROVIDER H&P - ASSESSMENT
34y  at 36w3d GA by LMP consistent with 1st trimester sono who presents to L&D for PPROM.    SSE: No active herpetic lesions noted externally or internally    A/P:   -Admit to L&D  -Consent  -Admission labs  -NPO, except ice chips   -IV fluids  -Labor: PPROM. Latent labor. Magdalena irregularly   -Fetus: Cat I tracing. Continuous toco and fetal monitoring.   -GBS: unknown, prophylaxis necessary secondary to  status  -Analgesia: epidural prn    Consented for vaginal delivery, possible operative vaginal delivery, possible  section, and any other indicated procedures.  The risks, benefits and alternatives of all above procedures were discussed. Questions were encouraged and answered. Written consent was signed.

## 2024-07-09 NOTE — OB RN DELIVERY SUMMARY - NS_SEPSISRSKCALC_OBGYN_ALL_OB_FT
EOS calculated successfully. EOS Risk Factor: 0.5/1000 live births ([facility] national incidence); GA=36w3d; Temp=98.4; ROM=13.667; GBS='Unknown'; Antibiotics='GBS specific antibiotics > 2 hrs prior to birth'

## 2024-07-09 NOTE — OB RN DELIVERY SUMMARY - NSSELHIDDEN_OBGYN_ALL_OB_FT
[NS_DeliveryAttending1_OBGYN_ALL_OB_FT:[id number]],[NS_DeliveryRN_OBGYN_ALL_OB_FT:[id number]=]

## 2024-07-09 NOTE — OB PROVIDER LABOR PROGRESS NOTE - ASSESSMENT
-Cat I FHT  -S/p epi  -Continue pit  -GBS swab collected, will f/u
prepare to start pushing  positional changes being done, IV fluids running  peds to be present for delivery   COVID+, mild URI symptoms, continue airborne precautions  Dr Ryan notified

## 2024-07-09 NOTE — OB PROVIDER H&P - HISTORY OF PRESENT ILLNESS
35yo  at 36w3d presented to labor and delivery secondary to a gush of fluid.  Patient states that she was in her bed and heard a pop followed by a gush of fluid.  States the fluid was clear and nonmalodorous.  Denies contractions, vaginal bleeding and leakage of fluid.  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  Denies recent lesions and prodromal symptoms.  Patient endorses having URI symptoms starting Friday and was positive for COVID on a home test on .  Currently has a cough but is afebrile.   OBHx: primigravid  GYNHx: LMP 10/28/24; +genital HSV; denies fibroids, cysts and abnormal Pap smears  PMHx: denies  PSHx: denies  FHx: denies  SocHx: denies x3

## 2024-07-09 NOTE — OB PROVIDER DELIVERY SUMMARY - NSPROVIDERDELIVERYNOTE_OBGYN_ALL_OB_FT
Arrived as patient is pushing.  Tracing at this time with fetal tachycardia, moderate variability, and early decelerations with pushing.  Patient successfully pushed and delivered the infant with approximately 30min of pushing.  Body was delivered with one push, no shoulder dystocia, and no trauma at the time of delivery.  Pediatrics were present at delivery for  delivery and cat II tracing.   It was noticed that the left elbow was edematous and pediatrician evaluated the infant.   Infant moved left upper extremity without difficulty and grasped with appropriate strength. Infant also did not cry with moving the left elbow and appeared comfortable.   Pediatrician stated infant will be monitored and will xray as needed.  1st deg laceration repaired with 3-0 chromic and bilateral superficial periurethral lacerations repaired also with 3-0 chromic.  GEORGI Ryan MD

## 2024-07-10 ENCOUNTER — TRANSCRIPTION ENCOUNTER (OUTPATIENT)
Age: 34
End: 2024-07-10

## 2024-07-10 LAB
HCT VFR BLD CALC: 31.7 % — LOW (ref 34.5–45)
HGB BLD-MCNC: 10.6 G/DL — LOW (ref 11.5–15.5)

## 2024-07-10 RX ORDER — ACETAMINOPHEN 325 MG
3 TABLET ORAL
Qty: 0 | Refills: 0 | DISCHARGE
Start: 2024-07-10

## 2024-07-10 RX ORDER — PRENATAL VIT/IRON FUM/FOLIC AC 60 MG-1 MG
1 TABLET ORAL
Qty: 0 | Refills: 0 | DISCHARGE
Start: 2024-07-10

## 2024-07-10 RX ADMIN — Medication 975 MILLIGRAM(S): at 16:00

## 2024-07-10 RX ADMIN — Medication 975 MILLIGRAM(S): at 04:00

## 2024-07-10 RX ADMIN — Medication 975 MILLIGRAM(S): at 03:17

## 2024-07-10 RX ADMIN — Medication 975 MILLIGRAM(S): at 15:28

## 2024-07-10 RX ADMIN — Medication 975 MILLIGRAM(S): at 08:32

## 2024-07-10 RX ADMIN — Medication 600 MILLIGRAM(S): at 11:44

## 2024-07-10 RX ADMIN — Medication 600 MILLIGRAM(S): at 12:00

## 2024-07-10 RX ADMIN — Medication 975 MILLIGRAM(S): at 21:00

## 2024-07-10 RX ADMIN — Medication 975 MILLIGRAM(S): at 09:00

## 2024-07-10 RX ADMIN — Medication 600 MILLIGRAM(S): at 17:50

## 2024-07-10 RX ADMIN — Medication 1 TABLET(S): at 08:32

## 2024-07-10 RX ADMIN — Medication 3 MILLILITER(S): at 06:19

## 2024-07-10 RX ADMIN — Medication 975 MILLIGRAM(S): at 20:39

## 2024-07-10 RX ADMIN — Medication 600 MILLIGRAM(S): at 18:05

## 2024-07-10 NOTE — DISCHARGE NOTE OB - NS MD DC FALL RISK RISK
For information on Fall & Injury Prevention, visit: [email or web address] OR  [email or web address] OR  https://www.cdc.gov/steadi/patient.html

## 2024-07-10 NOTE — DISCHARGE NOTE OB - CARE PLAN
Principal Discharge DX:	Normal delivery  Assessment and plan of treatment:	1) Please take ibuprofen and/or Tylenol as needed for pain as prescribed.  2) Nothing in the vagina for 6 weeks (including no sex, no tampons, and no douching).  3) Please call your doctor for a follow up your postpartum appointment in 4-6 weeks.  4) Please continue taking vitamins postpartum.   5) Please call the office sooner if you have heavy vaginal bleeding, severe abdominal pain, or fever > 100.4F.  6) You may resume regular daily activity as tolerated   1

## 2024-07-10 NOTE — DISCHARGE NOTE OB - CARE PROVIDER_API CALL
Hannah Ryan  Obstetrics and Gynecology  [address] [place] [address]  Phone: [phone]  Fax: [phone]  Established Patient  Follow Up Time:

## 2024-07-10 NOTE — DISCHARGE NOTE OB - PATIENT PORTAL LINK FT
You can access the FollowMyHealth Patient Portal offered by [facility] by registering at the following website: [email or web address]. By joining [facility]’s FollowMyHealth portal, you will also be able to view your health information using other applications (apps) compatible with our system.

## 2024-07-10 NOTE — DISCHARGE NOTE OB - HOSPITAL COURSE
Patient underwent a normal spontaneous vaginal delivery at 36w3d due to PPROM. Post-partum course was uncomplicated. Pain is well controlled with PRN medication. She has no difficulty with ambulation, voiding, or PO intake. Lab values and vital signs are within normal limits prior to discharge.

## 2024-07-11 ENCOUNTER — APPOINTMENT (OUTPATIENT)
Dept: ANTEPARTUM | Facility: CLINIC | Age: 34
End: 2024-07-11

## 2024-07-11 ENCOUNTER — NON-APPOINTMENT (OUTPATIENT)
Age: 34
End: 2024-07-11

## 2024-07-11 ENCOUNTER — APPOINTMENT (OUTPATIENT)
Dept: OBGYN | Facility: CLINIC | Age: 34
End: 2024-07-11

## 2024-07-11 VITALS
OXYGEN SATURATION: 98 % | DIASTOLIC BLOOD PRESSURE: 84 MMHG | RESPIRATION RATE: 16 BRPM | HEART RATE: 63 BPM | SYSTOLIC BLOOD PRESSURE: 115 MMHG | TEMPERATURE: 98 F

## 2024-07-11 RX ADMIN — Medication 975 MILLIGRAM(S): at 09:00

## 2024-07-11 RX ADMIN — Medication 600 MILLIGRAM(S): at 06:17

## 2024-07-11 RX ADMIN — Medication 600 MILLIGRAM(S): at 01:11

## 2024-07-11 RX ADMIN — Medication 600 MILLIGRAM(S): at 00:26

## 2024-07-11 RX ADMIN — Medication 975 MILLIGRAM(S): at 03:22

## 2024-07-11 NOTE — PROGRESS NOTE ADULT - SUBJECTIVE AND OBJECTIVE BOX
S: Patient doing well. Minimal lochia. covid +, mild cough, otherwise feels well    O: Vital Signs Last 24 Hrs  T(C): 36.6 (10 Jul 2024 23:42), Max: 36.6 (10 Jul 2024 23:42)  T(F): 97.9 (10 Jul 2024 23:42), Max: 97.9 (10 Jul 2024 23:42)  HR: 58 (10 Jul 2024 23:42) (58 - 84)  BP: 96/50 (10 Jul 2024 23:42) (96/50 - 101/68)  BP(mean): --  RR: 16 (10 Jul 2024 23:42) (16 - 16)  SpO2: 99% (10 Jul 2024 23:42) (98% - 99%)    Parameters below as of 10 Jul 2024 23:42  Patient On (Oxygen Delivery Method): room air        Gen: NAD  Abd: soft, NT, ND, fundus firm below umbilicus  Ext: no tenderness    Labs:                        10.6   x     )-----------( x        ( 10 Jul 2024 07:16 )             31.7        Rubella status:    A: 34y PPD# 2 s/p      Doing well    Plan:  [ ] Discharge home.  Nothing in vagina and no heavy lifting for 6 weeks.   Follow up 6 weeks for post partum visit.  Call office for any fevers, chills, heavy vaginal bleeding, symptoms of depression, or any other concerning symptoms.  Continue motrin 600 mg every 6 hours.  covid precautions reviewed  circ done today    
S: Patient doing well. Minimal lochia. No complaints.  COVID + 7/6 without symptoms today but still on Isoloation precautions  Pumping and bottle feeding now.      O: Vital Signs Last 24 Hrs  T(C): 36.3 (10 Jul 2024 05:30), Max: 37.6 (2024 23:45)  T(F): 97.3 (10 Jul 2024 05:30), Max: 99.6 (2024 23:45)  HR: 52 (10 Jul 2024 05:30) (52 - 91)  BP: 98/54 (10 Jul 2024 05:30) (98/54 - 136/72)  BP(mean): --  RR: 16 (10 Jul 2024 05:30) (16 - 19)  SpO2: 96% (10 Jul 2024 05:30) (96% - 99%)    Parameters below as of 10 Jul 2024 05:30  Patient On (Oxygen Delivery Method): room air        Gen: NAD  Abd: soft, NT, ND, fundus firm below umbilicus  Ext: no tenderness    Labs:                        10.6   x     )-----------( x        ( 10 Jul 2024 07:16 )             31.7     07-09 @ 09:42 ABO Interpretation: --  ABO RH Interpretation: A POS  Antibody Screen: NEG  Rh Interpretation: --  Type + Screen: --      Rubella status: IMMUNE  Hepatitis Surface B Antigen: NEG    A: 34y PPD# 1 s/p      Doing well    Plan:  [ ] Routine post partum care with support for viral illness.  [ ] Discharge home in AM.  [ ] Circumcision consent obtained. Baby followed for blood sugar regulation and is not yet cleared for circumcision.  Pediatrician in to see baby who had swollen left arm but atraumatic delivery. Parents' questions referred to Dr Santiago. Good fetal movement of upper extermities.

## 2024-07-12 LAB
GROUP B BETA STREP DNA (PCR): SIGNIFICANT CHANGE UP
SOURCE GROUP B STREP: SIGNIFICANT CHANGE UP

## 2024-07-15 ENCOUNTER — APPOINTMENT (OUTPATIENT)
Dept: OBGYN | Facility: CLINIC | Age: 34
End: 2024-07-15

## 2024-07-15 LAB
CULTURE RESULTS: SIGNIFICANT CHANGE UP
SPECIMEN SOURCE: SIGNIFICANT CHANGE UP

## 2024-07-16 ENCOUNTER — NON-APPOINTMENT (OUTPATIENT)
Age: 34
End: 2024-07-16

## 2024-07-16 DIAGNOSIS — A60.00 HERPESVIRAL INFECTION OF UROGENITAL SYSTEM, UNSPECIFIED: ICD-10-CM

## 2024-07-16 DIAGNOSIS — Z3A.36 36 WEEKS GESTATION OF PREGNANCY: ICD-10-CM

## 2024-07-16 DIAGNOSIS — U07.1 COVID-19: ICD-10-CM

## 2024-07-16 DIAGNOSIS — Z79.82 LONG TERM (CURRENT) USE OF ASPIRIN: ICD-10-CM

## 2024-07-16 PROBLEM — Z86.19 PERSONAL HISTORY OF OTHER INFECTIOUS AND PARASITIC DISEASES: Chronic | Status: ACTIVE | Noted: 2024-07-09

## 2024-07-16 LAB — SURGICAL PATHOLOGY STUDY: SIGNIFICANT CHANGE UP

## 2024-07-22 ENCOUNTER — APPOINTMENT (OUTPATIENT)
Dept: OBGYN | Facility: CLINIC | Age: 34
End: 2024-07-22

## 2024-07-30 ENCOUNTER — APPOINTMENT (OUTPATIENT)
Dept: OBGYN | Facility: CLINIC | Age: 34
End: 2024-07-30

## 2024-08-05 ENCOUNTER — APPOINTMENT (OUTPATIENT)
Dept: OBGYN | Facility: CLINIC | Age: 34
End: 2024-08-05

## 2024-08-22 ENCOUNTER — APPOINTMENT (OUTPATIENT)
Dept: OBGYN | Facility: CLINIC | Age: 34
End: 2024-08-22
Payer: COMMERCIAL

## 2024-08-22 VITALS
BODY MASS INDEX: 18.49 KG/M2 | WEIGHT: 122 LBS | SYSTOLIC BLOOD PRESSURE: 106 MMHG | HEIGHT: 68 IN | DIASTOLIC BLOOD PRESSURE: 58 MMHG

## 2024-08-22 PROCEDURE — 0503F POSTPARTUM CARE VISIT: CPT

## 2024-08-22 NOTE — HISTORY OF PRESENT ILLNESS
[FreeTextEntry1] : HPI: 33yo  presents for contraception counseling after  on 24 Patient is doing well with good support at home Now formula feeding She has tried OCPs in the past. She denies denies any h/o tobacco use, VTE/stroke, cardiovascular disease, malignancy, migraines with aura, HTN, liver or gallbladder disease, malabsorptive syndrome / bariatric surgery. BMI: 19 All: NKDA Gyn: laceration well healed, cervix closed  Plan:  Counseled extensively on reversible methods: combine hormonal contraception, progesterone only oral contraceptives, IUD, Nexplanon, Depo Provera. Cleared to return to normal acitivity including sex Pt ultimately elected for OCPs, rx'd today, will likely try to baby #2 in 1 year. 30min spent GEORGI Ryan MD

## 2025-04-10 NOTE — OB RN PATIENT PROFILE - WEIGHT: TOTAL WEIGHT IN LBS
Blood pressure today is at goal.  Continue bisoprolol 10 mg daily.  May had to increase to 20 mg daily.  Monitor readings for the next 3 to 4 days and then send a message via [facility] to update.  Goal blood pressures less than 150/90 per JNC 8 guidelines.   27

## 2025-07-30 ENCOUNTER — NON-APPOINTMENT (OUTPATIENT)
Age: 35
End: 2025-07-30

## 2025-07-31 ENCOUNTER — APPOINTMENT (OUTPATIENT)
Dept: OBGYN | Facility: CLINIC | Age: 35
End: 2025-07-31
Payer: COMMERCIAL

## 2025-07-31 VITALS
WEIGHT: 104.5 LBS | SYSTOLIC BLOOD PRESSURE: 110 MMHG | DIASTOLIC BLOOD PRESSURE: 62 MMHG | HEIGHT: 68 IN | BODY MASS INDEX: 15.84 KG/M2

## 2025-07-31 DIAGNOSIS — Z01.419 ENCOUNTER FOR GYNECOLOGICAL EXAMINATION (GENERAL) (ROUTINE) W/OUT ABNORMAL FINDINGS: ICD-10-CM

## 2025-07-31 PROCEDURE — 99395 PREV VISIT EST AGE 18-39: CPT

## 2025-08-04 LAB
C TRACH RRNA SPEC QL NAA+PROBE: NOT DETECTED
CYTOLOGY CVX/VAG DOC THIN PREP: NORMAL
HPV HIGH+LOW RISK DNA PNL CVX: NOT DETECTED
N GONORRHOEA RRNA SPEC QL NAA+PROBE: NOT DETECTED
SOURCE TP AMPLIFICATION: NORMAL
T VAGINALIS RRNA SPEC QL NAA+PROBE: NOT DETECTED

## 2025-08-06 ENCOUNTER — NON-APPOINTMENT (OUTPATIENT)
Age: 35
End: 2025-08-06

## 2025-08-07 ENCOUNTER — NON-APPOINTMENT (OUTPATIENT)
Age: 35
End: 2025-08-07

## 2025-08-07 ENCOUNTER — APPOINTMENT (OUTPATIENT)
Dept: DERMATOLOGY | Facility: CLINIC | Age: 35
End: 2025-08-07
Payer: COMMERCIAL

## 2025-08-07 DIAGNOSIS — D22.9 MELANOCYTIC NEVI, UNSPECIFIED: ICD-10-CM

## 2025-08-07 DIAGNOSIS — L81.4 OTHER MELANIN HYPERPIGMENTATION: ICD-10-CM

## 2025-08-07 DIAGNOSIS — Z12.83 ENCOUNTER FOR SCREENING FOR MALIGNANT NEOPLASM OF SKIN: ICD-10-CM

## 2025-08-07 DIAGNOSIS — L72.9 FOLLICULAR CYST OF THE SKIN AND SUBCUTANEOUS TISSUE, UNSPECIFIED: ICD-10-CM

## 2025-08-07 PROCEDURE — 99203 OFFICE O/P NEW LOW 30 MIN: CPT

## 2025-08-27 ENCOUNTER — APPOINTMENT (OUTPATIENT)
Dept: OBGYN | Facility: CLINIC | Age: 35
End: 2025-08-27

## 2025-09-04 ENCOUNTER — APPOINTMENT (OUTPATIENT)
Dept: OBGYN | Facility: CLINIC | Age: 35
End: 2025-09-04
Payer: COMMERCIAL

## 2025-09-04 VITALS
BODY MASS INDEX: 16.28 KG/M2 | SYSTOLIC BLOOD PRESSURE: 108 MMHG | WEIGHT: 107.38 LBS | HEIGHT: 68 IN | DIASTOLIC BLOOD PRESSURE: 70 MMHG

## 2025-09-04 PROCEDURE — 76817 TRANSVAGINAL US OBSTETRIC: CPT

## 2025-09-04 PROCEDURE — 99215 OFFICE O/P EST HI 40 MIN: CPT | Mod: 25

## 2025-09-08 ENCOUNTER — ASOB RESULT (OUTPATIENT)
Age: 35
End: 2025-09-08

## 2025-09-08 ENCOUNTER — APPOINTMENT (OUTPATIENT)
Dept: MATERNAL FETAL MEDICINE | Facility: CLINIC | Age: 35
End: 2025-09-08
Payer: COMMERCIAL

## 2025-09-08 PROCEDURE — 99212 OFFICE O/P EST SF 10 MIN: CPT | Mod: 95

## 2025-09-10 ENCOUNTER — TRANSCRIPTION ENCOUNTER (OUTPATIENT)
Age: 35
End: 2025-09-10

## 2025-09-10 RX ORDER — DOXYLAMINE SUCCINATE AND PYRIDOXINE HYDROCHLORIDE 10; 10 MG/1; MG/1
10-10 TABLET, DELAYED RELEASE ORAL
Qty: 30 | Refills: 1 | Status: ACTIVE | COMMUNITY
Start: 2025-09-10 | End: 1900-01-01

## 2025-09-10 RX ORDER — METOCLOPRAMIDE 10 MG/1
10 TABLET ORAL
Qty: 30 | Refills: 1 | Status: ACTIVE | COMMUNITY
Start: 2025-09-10 | End: 1900-01-01